# Patient Record
Sex: FEMALE | Race: WHITE
[De-identification: names, ages, dates, MRNs, and addresses within clinical notes are randomized per-mention and may not be internally consistent; named-entity substitution may affect disease eponyms.]

---

## 2020-08-01 ENCOUNTER — HOSPITAL ENCOUNTER (INPATIENT)
Dept: HOSPITAL 74 - JDEL | Age: 35
LOS: 1 days | Discharge: HOME | DRG: 560 | End: 2020-08-02
Attending: OBSTETRICS & GYNECOLOGY | Admitting: OBSTETRICS & GYNECOLOGY
Payer: COMMERCIAL

## 2020-08-01 VITALS — BODY MASS INDEX: 37.8 KG/M2

## 2020-08-01 DIAGNOSIS — Z3A.39: ICD-10-CM

## 2020-08-01 LAB
ANION GAP SERPL CALC-SCNC: 12 MMOL/L (ref 8–16)
APTT BLD: 27 SECONDS (ref 25.2–36.5)
BASOPHILS # BLD: 0.2 % (ref 0–2)
BUN SERPL-MCNC: 7.1 MG/DL (ref 7–18)
CALCIUM SERPL-MCNC: 9.6 MG/DL (ref 8.5–10.1)
CHLORIDE SERPL-SCNC: 104 MMOL/L (ref 98–107)
CO2 SERPL-SCNC: 19 MMOL/L (ref 21–32)
CREAT SERPL-MCNC: 0.7 MG/DL (ref 0.55–1.3)
DEPRECATED RDW RBC AUTO: 16.8 % (ref 11.6–15.6)
EOSINOPHIL # BLD: 0 % (ref 0–4.5)
GLUCOSE SERPL-MCNC: 124 MG/DL (ref 74–106)
HCT VFR BLD CALC: 39.1 % (ref 32.4–45.2)
HGB BLD-MCNC: 13 GM/DL (ref 10.7–15.3)
INR BLD: 0.93 (ref 0.83–1.09)
LYMPHOCYTES # BLD: 7.9 % (ref 8–40)
MCH RBC QN AUTO: 30.3 PG (ref 25.7–33.7)
MCHC RBC AUTO-ENTMCNC: 33.3 G/DL (ref 32–36)
MCV RBC: 91 FL (ref 80–96)
MONOCYTES # BLD AUTO: 2.4 % (ref 3.8–10.2)
NEUTROPHILS # BLD: 89.5 % (ref 42.8–82.8)
PLATELET # BLD AUTO: 363 K/MM3 (ref 134–434)
PMV BLD: 8.7 FL (ref 7.5–11.1)
POTASSIUM SERPLBLD-SCNC: 4.1 MMOL/L (ref 3.5–5.1)
PT PNL PPP: 11 SEC (ref 9.7–13)
RBC # BLD AUTO: 4.29 M/MM3 (ref 3.6–5.2)
SODIUM SERPL-SCNC: 135 MMOL/L (ref 136–145)
WBC # BLD AUTO: 17 K/MM3 (ref 4–10)

## 2020-08-01 PROCEDURE — U0003 INFECTIOUS AGENT DETECTION BY NUCLEIC ACID (DNA OR RNA); SEVERE ACUTE RESPIRATORY SYNDROME CORONAVIRUS 2 (SARS-COV-2) (CORONAVIRUS DISEASE [COVID-19]), AMPLIFIED PROBE TECHNIQUE, MAKING USE OF HIGH THROUGHPUT TECHNOLOGIES AS DESCRIBED BY CMS-2020-01-R: HCPCS

## 2020-08-01 PROCEDURE — 0HQ9XZZ REPAIR PERINEUM SKIN, EXTERNAL APPROACH: ICD-10-PCS | Performed by: OBSTETRICS & GYNECOLOGY

## 2020-08-01 RX ADMIN — Medication SCH TAB: at 11:55

## 2020-08-01 RX ADMIN — Medication SCH MLS/HR: at 12:35

## 2020-08-01 RX ADMIN — Medication SCH MLS/HR: at 08:43

## 2020-08-01 NOTE — PN
Progress Note, Labor


  ** Vaginal Exam #1


Labor Exam Date: 20


Labor Exam Time: 07:00


Fetal Heart Rate (range): Cat I


Dilatation: 9


Effacement (%): 100


Amniotic Membrane Status: Ruptured


Fetal Presentation: Vertex/Position


Fetal Station: -1


Remarks: 





Pt comfortable


Contractions over 5mins apart, will start pitocin


Anticipate BRITTANY Mota MD

## 2020-08-01 NOTE — HP
Past Medical History





- Admission


Chief Complaint: Contractions


History of Present Illness: 





34yo  @ 39.6wks by LMP/sono, BRANDON 19 here with contractions, LOF


No VB. +FM. Noted LOF @ 5AM- clear





Preg c/b: AMA, negative testing; GBS positive


PNC @ [facility] - Gianna He


History Source: Patient


Limitations to Obtaining History: No Limitations, Clinical Condition, Dementia, 

Intoxication, Intubated, Language Barrier, Physical Impairment, Poor Historian, 

Uncooperative, Unresponsive, Other





- Past Medical History


CNS: No: Alzheimer's, CVA, Dementia, Migraine, Multiple Sclerosis, Peripheral 

Neuropathy, Parkinson's, Seizure, Syncope, TIA, Vertigo, Other


Cardiovascular: No: AFIB, Aneurysm, Aortic Insufficiency, Aortic Stenosis, CAD, 

CHF, Deep Vein Thrombosis, HTN, Hyperlipdemia, MI, Mitral Insufficiency, Mitral 

Stenosis, Murmur, Pulmonary Hypertension, Other


Pulmonary: No: Asthma, Bronchitis, Cancer, COPD, O2 Dependent, Pneumonia, 

Previously Intubated, Pulmonary Embolus, Pulmonary Fibrosis, Sleep Apnea, Other


Gastrointestinal: No: Ascites, Cancer, Constipation, Crohn's Disease, 

Diverticulitis, Diverticulosis, Esophageal Varices, Gastritis, GERD, GI Bleed, 

Hemorrhoids, Hiatal Hernia, Inflamatory Bowel Disease, Irritable Bowel Disease, 

Pancreatitis, Peptic Ulcer Disease, Ulcerative Colitis, Other


Hepatobiliary: No: Cirrhosis, Cholelithiasis, Cholecystitis, 

Choledocholithiasis, Hepatitis A, Hepatitis B, Hepatitis C, Other


Reproductive: No: Ectopic Pregnancy, Endometriosis, Fibroids, PID, Polycystic 

Ovary Syndrome, Postmenopausal, Other


...: 3


...Para: 2


...Term: 2


...: 0


...Spon : 0


...Induced : 0


...Living Children: 2


... Weeks Gestation by Dates: 39.6


...EDC by Dates: 20


...EDC by Sono: 20


Heme/Onc: No: Anemia, B12 Deficiency, Bleeding Disorder, Cancer, Current 

Chemotherapy, Current Radiation Therapy, Hemochromatosis, Hypercoaguable State, 

Myeloproliferative Synd, Sickle Cell Disease, Sickle Cell Trait, 

Thrombocytopenia, Other


Infectious Disease: No: AIDS, C-Diff, Herpes Zoster, HIV, MRSA, STD's, 

Tuberculosis, VREF, Other


Psych: No: Addictions, Anxiety, Bipolar, Depression, Panic, Psychosis, 

Schizophrenia, Other


Musculoskeletal: No: Bursitis, Chronic low back pain, Hemiparesis, Hemiplegia, 

Osteoarthritis, Paraplegia, Other


Rheumatology: No: Fibromyalgia, Gout, Lupus, Rheumatoid Arthritis, Sarcoidosis, 

Vasculitis, Other


ENT: No: Allergic Rhinitis, Sinusitis, Other


Dermatology: No: Basal Cell, Cellulitis, Eczema, Melanoma, Psoriasis, Squamous 

Cell, Other





- Past Surgical History


Past Surgical History: Yes: None


Hx Myomectomy: No


Hx Transabdominal Cerclage: No





- Smoking History


Smoking history: Current every day smoker


Have you smoked in the past 12 months: No





- Alcohol/Substance Use


Hx Alcohol Use: No


History of Substance Use: reports: None





- Social History


Usual Living Arrangement: Yes: With Spouse


Do you think of yourself as: Straight/Heterosexual


ADL: Independent


History of Recent Travel: No





Home Medications





- Allergies


Allergies/Adverse Reactions: 


                                    Allergies











Allergy/AdvReac Type Severity Reaction Status Date / Time


 


No Known Allergies Allergy   Verified 20 18:38














- Home Medications


Home Medications: 


Ambulatory Orders





Prenatal Vitamins (Sjr) - 1 tab PO DAILY 20 











Physical Exam - Maternity


Constitutional: Yes: Well Nourished, No Distress, Calm





- Abdominal Exam/OB


Number of Fetuses: Single


Fetal Presentation: Vertex


Contractions: Yes


Regularity: Regular


Intensity: Mod/Strong


Fetal Monitor Mode: External


Fetal Heart Rate Location: [facility]


Category: I


Accelerations: Non-Uniform


Decelerations: None





- Vaginal Exam/OB


Vaginal Bleeding: No


Speculum Exam: No


Dilatation (cm): 9.5


Effacement (%): 100


Amniotic Membrane Status: Ruptured


Nitrazine Test: Positive


Amniotic Fluid: Yes: Clear


Fetal Presentation: Vertex/Position





- Physical Exam


Edema: No





Imaging





- Results


Ultrasound: Report Reviewed





Problem List





- Problems


(1) Precipitous delivery


Code(s): O62.3 - PRECIPITATE LABOR   





Assessment/Plan





34yo  @ 39.6wks here in labor


Admit to L&D


NPO, IVFs


Amp for GBS positive, will not be sufficient 


Anticipate BRITTANY Mota MD

## 2020-08-02 VITALS — SYSTOLIC BLOOD PRESSURE: 110 MMHG | TEMPERATURE: 97.6 F | DIASTOLIC BLOOD PRESSURE: 71 MMHG | HEART RATE: 60 BPM

## 2020-08-02 LAB
BASOPHILS # BLD: 0.5 % (ref 0–2)
DEPRECATED RDW RBC AUTO: 17.1 % (ref 11.6–15.6)
EOSINOPHIL # BLD: 1 % (ref 0–4.5)
HCT VFR BLD CALC: 38.4 % (ref 32.4–45.2)
HGB BLD-MCNC: 12.5 GM/DL (ref 10.7–15.3)
LYMPHOCYTES # BLD: 30.4 % (ref 8–40)
MCH RBC QN AUTO: 30.3 PG (ref 25.7–33.7)
MCHC RBC AUTO-ENTMCNC: 32.7 G/DL (ref 32–36)
MCV RBC: 92.7 FL (ref 80–96)
MONOCYTES # BLD AUTO: 6.4 % (ref 3.8–10.2)
NEUTROPHILS # BLD: 61.7 % (ref 42.8–82.8)
PLATELET # BLD AUTO: 348 K/MM3 (ref 134–434)
PMV BLD: 8.4 FL (ref 7.5–11.1)
RBC # BLD AUTO: 4.14 M/MM3 (ref 3.6–5.2)
WBC # BLD AUTO: 14.2 K/MM3 (ref 4–10)

## 2020-08-02 RX ADMIN — Medication SCH TAB: at 09:22

## 2020-08-02 NOTE — PN
Post Partum Progress Note





- Subjective


Subjective: 





Pain controlled. No fevers/chills. Breastfeeding. Ambulating


Type of Delivery: 


Vital Signs: 


                                   Vital Signs











Temperature  97.6 F   20 08:50


 


Pulse Rate  60   20 08:50


 


Respiratory Rate  18   20 08:50


 


Blood Pressure  110/71   20 08:50


 


O2 Sat by Pulse Oximetry (%)  98   20 22:00











Uterus: Yes: Fundus below umbilicus


Abdomen/GI: Yes: Abdomen soft, Tolerating PO


Lochia: Yes: Rubra


Lochia, amount: Small


Extremities: Yes: Calves non-tender


Perineum: Yes: Laceration


Activity: Ambulating





- Labs


Labs: 


                                       CBC











WBC  17.0 K/mm3 (4.0-10.0)  H  20  06:15    


 


RBC  4.29 M/mm3 (3.60-5.2)   20  06:15    


 


Hgb  13.0 GM/dL (10.7-15.3)   20  06:15    


 


Hct  39.1 % (32.4-45.2)   20  06:15    


 


MCV  91.0 fl (80-96)   20  06:15    


 


MCH  30.3 pg (25.7-33.7)   20  06:15    


 


MCHC  33.3 g/dl (32.0-36.0)   20  06:15    


 


RDW  16.8 % (11.6-15.6)  H  20  06:15    


 


Plt Count  363 K/MM3 (134-434)   20  06:15    


 


MPV  8.7 fl (7.5-11.1)   20  06:15    


 


Absolute Neuts (auto)  15.2 K/mm3 (1.5-8.0)  H  20  06:15    


 


Neutrophils %  89.5 % (42.8-82.8)  H  20  06:15    


 


Lymphocytes %  7.9 % (8-40)  L  20  06:15    


 


Monocytes %  2.4 % (3.8-10.2)  L  20  06:15    


 


Eosinophils %  0.0 % (0-4.5)   20  06:15    


 


Basophils %  0.2 % (0-2.0)   20  06:15    


 


Nucleated RBC %  0 % (0-0)   20  06:15    














Problem List





- Problems


(1) Precipitous delivery


Code(s): O62.3 - PRECIPITATE LABOR   





Assessment/Plan





34yo  s/p , PPD#1


Routine PP care


PO pain control


FU AM labs


D/C to home by PPD#2





RANJAN Mota MD

## 2020-08-02 NOTE — DS
Physical Examination


Vital Signs: 


                                   Vital Signs











Temperature  97.6 F   20 08:50


 


Pulse Rate  60   20 08:50


 


Respiratory Rate  18   20 08:50


 


Blood Pressure  110/71   20 08:50


 


O2 Sat by Pulse Oximetry (%)  98   20 22:00











Constitutional: Yes: Well Nourished, No Distress, Calm


Eyes: Yes: WNL, Conjunctiva Clear, EOM Intact


HENT: Yes: WNL, Atraumatic, Normocephalic


Neck: Yes: WNL, Supple, Trachea Midline


Cardiovascular: Yes: WNL, Regular Rate and Rhythm


Respiratory: Yes: WNL, Regular, CTA Bilaterally


Gastrointestinal: Yes: WNL, Normal Bowel Sounds


Musculoskeletal: Yes: WNL


Extremities: Yes: WNL


Edema: No


Integumentary: Yes: WNL


Neurological: Yes: WNL, Alert, Oriented


...Motor Strength: WNL


Psychiatric: Yes: WNL


Labs: 


                                    CBC, BMP





                                 20 09:12 





                                 20 06:15 











Discharge Summary


Problems reviewed: Yes


Reason For Visit: LABOR


Current Active Problems





Precipitous delivery (Acute)








Procedures: Principal: 


Hospital Course: 


Patient presented in labor


She had an uncomplicated 


She met all postpartum milestones


She was discharged home in stable condition





RANJAN Mota MD


Condition: Stable





- Instructions


Diet, Activity, Other Instructions: 


Regular Diet


Follow up in 4 weeks for your postpartum visit


Referrals: 


Maria De Jesus Mota MD [Family Provider] - 


Disposition: HOME





- Home Medications


Comprehensive Discharge Medication List: 


Ambulatory Orders





Prenatal Vitamins (Sjr) - 1 tab PO DAILY 20 


Breast Pump 1 each MC 5XD 30 Days #1 each 20 


Ibuprofen 600 mg PO Q6H PRN #30 tablet 20

## 2020-08-03 LAB — PH SPEC: (no result) [PH]
